# Patient Record
Sex: FEMALE | Race: OTHER | HISPANIC OR LATINO | URBAN - METROPOLITAN AREA
[De-identification: names, ages, dates, MRNs, and addresses within clinical notes are randomized per-mention and may not be internally consistent; named-entity substitution may affect disease eponyms.]

---

## 2023-07-09 ENCOUNTER — EMERGENCY (EMERGENCY)
Facility: HOSPITAL | Age: 23
LOS: 1 days | Discharge: HOME | End: 2023-07-09
Attending: EMERGENCY MEDICINE | Admitting: EMERGENCY MEDICINE
Payer: COMMERCIAL

## 2023-07-09 VITALS
HEIGHT: 62 IN | OXYGEN SATURATION: 99 % | DIASTOLIC BLOOD PRESSURE: 62 MMHG | HEART RATE: 87 BPM | WEIGHT: 218.04 LBS | RESPIRATION RATE: 17 BRPM | TEMPERATURE: 98 F | SYSTOLIC BLOOD PRESSURE: 95 MMHG

## 2023-07-09 PROCEDURE — 99283 EMERGENCY DEPT VISIT LOW MDM: CPT | Mod: 25

## 2023-07-09 PROCEDURE — 10081 I&D PILONIDAL CYST COMP: CPT

## 2023-07-09 PROCEDURE — 10080 I&D PILONIDAL CYST SIMPLE: CPT

## 2023-07-09 RX ORDER — IBUPROFEN 200 MG
600 TABLET ORAL ONCE
Refills: 0 | Status: COMPLETED | OUTPATIENT
Start: 2023-07-09 | End: 2023-07-09

## 2023-07-09 RX ORDER — LIDOCAINE HYDROCHLORIDE AND EPINEPHRINE 10; 10 MG/ML; UG/ML
2 INJECTION, SOLUTION INFILTRATION; PERINEURAL ONCE
Refills: 0 | Status: COMPLETED | OUTPATIENT
Start: 2023-07-09 | End: 2023-07-09

## 2023-07-09 RX ORDER — IBUPROFEN 200 MG
1 TABLET ORAL
Qty: 20 | Refills: 0
Start: 2023-07-09

## 2023-07-09 RX ADMIN — Medication 600 MILLIGRAM(S): at 14:49

## 2023-07-09 RX ADMIN — LIDOCAINE HYDROCHLORIDE AND EPINEPHRINE 2 MILLILITER(S): 10; 10 INJECTION, SOLUTION INFILTRATION; PERINEURAL at 14:49

## 2023-07-09 RX ADMIN — Medication 1 TABLET(S): at 14:49

## 2023-07-09 NOTE — ED PROVIDER NOTE - HIV OFFER
1. \"Have you been to the ER, urgent care clinic since your last visit? Hospitalized since your last visit? \" No    2. \"Have you seen or consulted any other health care providers outside of the [address] [place] [address] since your last visit? \" No     3. For patients aged 39-70: Has the patient had a colonoscopy / FIT/ Cologuard? NA - based on age      If the patient is female:    4. For patients aged 41-77: Has the patient had a mammogram within the past 2 years? NA - based on age or sex      11. For patients aged 21-65: Has the patient had a pap smear?  No     Chief Complaint   Patient presents with    Ear Pain    Headache       Visit Vitals  /84 (BP 1 Location: Right upper arm, BP Patient Position: Sitting, BP Cuff Size: Adult)   Pulse 65   Temp 98.1 °F (36.7 °C) (Oral)   Resp 17   Ht 5' 5\" (1.651 m)   Wt 193 lb (87.5 kg)   LMP 04/02/2022   SpO2 99%   BMI 32.12 kg/m² Opt out

## 2023-07-09 NOTE — ED ADULT NURSE NOTE - CHIEF COMPLAINT QUOTE
Pt sent by  for eval for pilonidal abscess x 3-4 days. Pt denies fevers, drainage, radiating pain. No pmh.

## 2023-07-09 NOTE — ED ADULT TRIAGE NOTE - BIRTH SEX
Interval History: No acute events    Review of Systems   Respiratory:  Negative for shortness of breath.    Gastrointestinal:  Negative for constipation, diarrhea, nausea and vomiting.   Objective:     Vital Signs (Most Recent):  Temp: 96.3 °F (35.7 °C) (04/22/22 1122)  Pulse: 93 (04/22/22 1122)  Resp: 19 (04/22/22 1122)  BP: (!) 121/58 (04/22/22 1122)  SpO2: 98 % (04/22/22 1122)   Vital Signs (24h Range):  Temp:  [96.2 °F (35.7 °C)-99.4 °F (37.4 °C)] 96.3 °F (35.7 °C)  Pulse:  [] 93  Resp:  [18-20] 19  SpO2:  [95 %-100 %] 98 %  BP: (121-192)/(58-81) 121/58     Weight: 107.8 kg (237 lb 10.5 oz)  Body mass index is 39.55 kg/m².    Estimated Creatinine Clearance: 103.8 mL/min (based on SCr of 0.8 mg/dL).    Physical Exam  Cardiovascular:      Heart sounds: Normal heart sounds.   Pulmonary:      Breath sounds: Normal breath sounds.   Abdominal:      General: Bowel sounds are normal. There is no distension.      Palpations: Abdomen is soft.      Tenderness: There is abdominal tenderness.      Comments: Still some tenderness at sites of abdominal injections and indurated areas; ecchymoses of lower abdomen persist    Musculoskeletal:      Right lower leg: No edema.      Left lower leg: No edema.      Comments: Both right and left leg erythema and edema has decreased       Significant Labs: All pertinent labs within the past 24 hours have been reviewed.    Significant Imaging: I have reviewed all pertinent imaging results/findings within the past 24 hours.   Female

## 2023-07-09 NOTE — ED PROVIDER NOTE - OBJECTIVE STATEMENT
here with swelling/pain in upper buttock x 3-4 days. No drainage, fever, chills, trauma. Never had before. Didn't try any treatments. Went to urgent care and referred to ED. No treatment given.

## 2023-07-09 NOTE — ED PROVIDER NOTE - NSFOLLOWUPINSTRUCTIONS_ED_ALL_ED_FT
Please see referral to surgery for followup and possible removal of your cyst.  Call for appointment.  If you have any problems with followup, please call the ED Referral Coordinator at [phone].  Return to the ER if symptoms worsen or other concerns.    Pilonidal Cyst Drainage, Care After  The following information offers guidance on how to care for yourself after your procedure. Your health care provider may also give you more specific instructions. If you have problems or questions, contact your health care provider.    What can I expect after the procedure?  After the procedure, it is common to have:  Pain that gets better when you take medicine.  Some fluid or blood coming from your wound.  Follow these instructions at home:  Medicines    Take over-the-counter and prescription medicines only as told by your health care provider.  If you were prescribed antibiotics, take them as told by your health care provider. Do not stop using the antibiotic even if you start to feel better.  Ask your health care provider if the medicine prescribed to you:  Requires you to avoid driving or using machinery.  Can cause constipation. You may need to take these actions to prevent or treat constipation:  Drink enough fluid to keep your urine pale yellow.  Take over-the-counter or prescription medicines.  Eat foods that are high in fiber, such as beans, whole grains, and fresh fruits and vegetables.  Limit foods that are high in fat and processed sugars, such as fried or sweet foods.  Bathing    Do not take baths or showers, swim, or use a hot tub until your health care provider approves. You may only be allowed to take sponge baths.  When you can return to bathing will depend on the type of wound that you have.  If your wound was packed with a germ-free packing material, keep the area dry until your packing has been removed. After the packing has been removed, you may start taking showers when your health care provider approves.  If the edges of the incision around your wound were stitched to your skin (marsupialization), you may start taking showers the day after surgery, or when your health care provider approves. Let the water from the shower moisten your bandage (dressing). This will make it easier to take off. Remove your dressing before you shower.  While bathing, clean your buttocks area gently with soap and water.  After bathing:  Pat the area dry with a soft, clean towel.  If directed, cover the area with a clean dressing.  Wound care    Two stitched wounds. One is normal. The other is red with pus and infected.  You may need to have a caregiver help you with wound care and dressing changes.  Follow instructions from your health care provider about how to take care of your wound. Make sure you:  Wash your hands with soap and water for at least 20 seconds before and after you change your dressing. If soap and water are not available, use hand .  Change your dressing as told by your health care provider.  Leave stitches (sutures), skin glue, or adhesive strips in place. These skin closures may need to stay in place for 2 weeks or longer. If adhesive strip edges start to loosen and curl up, you may trim the loose edges. Do not remove adhesive strips completely unless your health care provider tells you to do that.  Check your wound every day for signs of infection. Check for:  Redness, swelling, or more pain.  More fluid or blood.  Warmth.  Pus or a bad smell.  Follow any additional instructions from your health care provider on how to care for your wound, such as wound cleaning, wound flushing (irrigation), or packing your wound with a dressing.  If you had marsupialization, ask your health care provider when you can stop using a dressing.  Lifestyle    Do not do activities that irritate or put pressure on your buttocks for about 2 weeks, or as long as told by your health care provider. These activities include bike riding, running, and anything that involves a twisting motion.  Rest as told by your health care provider.  Do not sit for a long time without moving. Get up to take short walks every 1–2 hours. This will improve blood flow and breathing. Ask for help if you feel weak or unsteady.  Sleep on your side instead of your back.  Avoid wearing tight underwear and tight pants.  Return to your normal activities as told by your health care provider. Ask your health care provider what activities are safe for you.  General instructions    Do not use any products that contain nicotine or tobacco. These products include cigarettes, chewing tobacco, and vaping devices, such as e-cigarettes. These can delay wound healing after surgery. If you need help quitting, ask your health care provider.  Keep all follow-up visits. This is important to monitor healing. If you had an incision and drainage procedure with wound packing, your packing may be changed or removed at follow-up visits.  Contact a health care provider if:  You have pain that does not get better with medicine.  You have any of these signs of infection:  More redness, swelling, or pain around your incision.  More fluid or blood coming from your incision.  Warmth coming from your incision.  Pus or a bad smell coming from your incision.  A fever.  You have muscle aches.  You feel generally sick.  You are dizzy.  Summary  A pilonidal cyst is a fluid-filled sac that forms under the skin near the tailbone. It is common to have some fluid or blood coming from your wound after a procedure to drain a pilonidal cyst.  If you were prescribed antibiotics, take them as told by your health care provider. Do not stop taking the antibiotic even if you start to feel better.  You may need to have a caregiver help you with wound care and dressing changes.  Return to your health care provider as instructed to have any packing material changed or removed.  This information is not intended to replace advice given to you by your health care provider. Make sure you discuss any questions you have with your health care provider. Return in 48 hours for packing removal/ wound check and please see referral to surgery for followup and possible removal of your cyst.  Call for appointment.  If you have any problems with followup, please call the ED Referral Coordinator at [phone].  Return to the ER if symptoms worsen or other concerns.     Pilonidal Cyst Drainage, Care After  The following information offers guidance on how to care for yourself after your procedure. Your health care provider may also give you more specific instructions. If you have problems or questions, contact your health care provider.    What can I expect after the procedure?  After the procedure, it is common to have:  Pain that gets better when you take medicine.  Some fluid or blood coming from your wound.  Follow these instructions at home:  Medicines    Take over-the-counter and prescription medicines only as told by your health care provider.  If you were prescribed antibiotics, take them as told by your health care provider. Do not stop using the antibiotic even if you start to feel better.  Ask your health care provider if the medicine prescribed to you:  Requires you to avoid driving or using machinery.  Can cause constipation. You may need to take these actions to prevent or treat constipation:  Drink enough fluid to keep your urine pale yellow.  Take over-the-counter or prescription medicines.  Eat foods that are high in fiber, such as beans, whole grains, and fresh fruits and vegetables.  Limit foods that are high in fat and processed sugars, such as fried or sweet foods.  Bathing    Do not take baths or showers, swim, or use a hot tub until your health care provider approves. You may only be allowed to take sponge baths.  When you can return to bathing will depend on the type of wound that you have.  If your wound was packed with a germ-free packing material, keep the area dry until your packing has been removed. After the packing has been removed, you may start taking showers when your health care provider approves.  If the edges of the incision around your wound were stitched to your skin (marsupialization), you may start taking showers the day after surgery, or when your health care provider approves. Let the water from the shower moisten your bandage (dressing). This will make it easier to take off. Remove your dressing before you shower.  While bathing, clean your buttocks area gently with soap and water.  After bathing:  Pat the area dry with a soft, clean towel.  If directed, cover the area with a clean dressing.  Wound care    Two stitched wounds. One is normal. The other is red with pus and infected.  You may need to have a caregiver help you with wound care and dressing changes.  Follow instructions from your health care provider about how to take care of your wound. Make sure you:  Wash your hands with soap and water for at least 20 seconds before and after you change your dressing. If soap and water are not available, use hand .  Change your dressing as told by your health care provider.  Leave stitches (sutures), skin glue, or adhesive strips in place. These skin closures may need to stay in place for 2 weeks or longer. If adhesive strip edges start to loosen and curl up, you may trim the loose edges. Do not remove adhesive strips completely unless your health care provider tells you to do that.  Check your wound every day for signs of infection. Check for:  Redness, swelling, or more pain.  More fluid or blood.  Warmth.  Pus or a bad smell.  Follow any additional instructions from your health care provider on how to care for your wound, such as wound cleaning, wound flushing (irrigation), or packing your wound with a dressing.  If you had marsupialization, ask your health care provider when you can stop using a dressing.  Lifestyle    Do not do activities that irritate or put pressure on your buttocks for about 2 weeks, or as long as told by your health care provider. These activities include bike riding, running, and anything that involves a twisting motion.  Rest as told by your health care provider.  Do not sit for a long time without moving. Get up to take short walks every 1–2 hours. This will improve blood flow and breathing. Ask for help if you feel weak or unsteady.  Sleep on your side instead of your back.  Avoid wearing tight underwear and tight pants.  Return to your normal activities as told by your health care provider. Ask your health care provider what activities are safe for you.  General instructions    Do not use any products that contain nicotine or tobacco. These products include cigarettes, chewing tobacco, and vaping devices, such as e-cigarettes. These can delay wound healing after surgery. If you need help quitting, ask your health care provider.  Keep all follow-up visits. This is important to monitor healing. If you had an incision and drainage procedure with wound packing, your packing may be changed or removed at follow-up visits.  Contact a health care provider if:  You have pain that does not get better with medicine.  You have any of these signs of infection:  More redness, swelling, or pain around your incision.  More fluid or blood coming from your incision.  Warmth coming from your incision.  Pus or a bad smell coming from your incision.  A fever.  You have muscle aches.  You feel generally sick.  You are dizzy.  Summary  A pilonidal cyst is a fluid-filled sac that forms under the skin near the tailbone. It is common to have some fluid or blood coming from your wound after a procedure to drain a pilonidal cyst.  If you were prescribed antibiotics, take them as told by your health care provider. Do not stop taking the antibiotic even if you start to feel better.  You may need to have a caregiver help you with wound care and dressing changes.  Return to your health care provider as instructed to have any packing material changed or removed.  This information is not intended to replace advice given to you by your health care provider. Make sure you discuss any questions you have with your health care provider.

## 2023-07-09 NOTE — ED ADULT NURSE NOTE - OBJECTIVE STATEMENT
Patient came to ER stating "I have a cyst to back side and it found it 3 days ago and it is very painful and bigger now".

## 2023-07-09 NOTE — ED PROVIDER NOTE - PATIENT PORTAL LINK FT
You can access the FollowMyHealth Patient Portal offered by [facility] by registering at the following website: [email or web address]. By joining [employer]’s FollowMyHealth portal, you will also be able to view your health information using other applications (apps) compatible with our system.

## 2023-07-09 NOTE — ED ADULT TRIAGE NOTE - ACCOMPANIED BY
10/01/2022  Fitz Christianson    Current provider:  Antonio Hadley Jr.,*    Device interrogation:  TXP LVAD INTERROGATIONS 10/1/2022 10/1/2022 10/1/2022 10/1/2022 10/1/2022 9/30/2022 9/30/2022   Type HeartMate3 HeartMate3 HeartMate3 HeartMate3 HeartMate3 HeartMate3 HeartMate3   Flow 5.2 5.5 5.4 5.5 5.3 5.4 5.4   Speed 6400 6400 6400 6400 6400 6400 6400   PI 2.4 2.3 1.6 1.5 2.1 1.5 2.1   Power ([place]) 5.4 5.5 5.5 5.5 5.5 5.5 5.5   LSL 6000 6000 6000 6000 6000 6000 6000   Low Flow Alarm - - - - - - -   High Power Alarm - - - - - - -   Pulsatility Intermittent pulse Intermittent pulse Intermittent pulse Intermittent pulse Pulse Pulse Intermittent pulse          Rounded on Tim Richards to ensure all mechanical assist device settings (IABP or VAD) were appropriate and all parameters were within limits.  I was able to ensure all back up equipment was present, the staff had no issues, and the Perfusion Department daily rounding was complete.      For implantable VADs: Interrogation of Ventricular assist device was performed with analysis of device parameters and review of device function. I have personally reviewed the interrogation findings and agree with findings as stated.     In emergency, the nursing units have been notified to contact the perfusion department either by:  Calling [id number] from 630am to 4pm Mon thru Fri, utilizing the On-Call Finder functionality of Epic and searching for Perfusion, or by contacting the hospital  from 4pm to 630am and on weekends and asking to speak with the perfusionist on call.    9:13 PM      Self

## 2023-07-09 NOTE — ED ADULT NURSE NOTE - NSFALLUNIVINTERV_ED_ALL_ED
Bed/Stretcher in lowest position, wheels locked, appropriate side rails in place/Call bell, personal items and telephone in reach/Instruct patient to call for assistance before getting out of bed/chair/stretcher/Non-slip footwear applied when patient is off stretcher/[place] to call system/Physically safe environment - no spills, clutter or unnecessary equipment/Purposeful proactive rounding/Room/bathroom lighting operational, light cord in reach

## 2023-07-09 NOTE — ED PROVIDER NOTE - CARE PROVIDER_API CALL
Estella Rivera  Surgery  [address] [place] [address], [address]  [place] [address]  Phone: [phone]  Fax: [phone]  Follow Up Time:

## 2023-07-09 NOTE — ED PROVIDER NOTE - CLINICAL SUMMARY MEDICAL DECISION MAKING FREE TEXT BOX
pilonidal abscess. no fever/systemic symptoms. well appearing. I and d done. tx w bactrim as some surrounding cellulitis. f/u surgery for cyst removal. return precautions discussed pilonidal abscess. no fever/systemic symptoms. well appearing. I and d done. tx w bactrim as some surrounding cellulitis. return 2 days for check/packing removal. f/u surgery for cyst removal. return precautions discussed

## 2023-07-11 ENCOUNTER — EMERGENCY (EMERGENCY)
Facility: HOSPITAL | Age: 23
LOS: 1 days | Discharge: LEFT BEFORE DISCHARGE | End: 2023-07-11
Admitting: EMERGENCY MEDICINE
Payer: MEDICAID

## 2023-07-11 VITALS
SYSTOLIC BLOOD PRESSURE: 110 MMHG | HEIGHT: 62 IN | WEIGHT: 218.04 LBS | OXYGEN SATURATION: 100 % | HEART RATE: 61 BPM | DIASTOLIC BLOOD PRESSURE: 59 MMHG | RESPIRATION RATE: 18 BRPM | TEMPERATURE: 99 F

## 2023-07-11 DIAGNOSIS — L02.31 CUTANEOUS ABSCESS OF BUTTOCK: ICD-10-CM

## 2023-07-11 DIAGNOSIS — M79.89 OTHER SPECIFIED SOFT TISSUE DISORDERS: ICD-10-CM

## 2023-07-11 DIAGNOSIS — L05.01 PILONIDAL CYST WITH ABSCESS: ICD-10-CM

## 2023-07-11 DIAGNOSIS — Z53.21 PROCEDURE AND TREATMENT NOT CARRIED OUT DUE TO PATIENT LEAVING PRIOR TO BEING SEEN BY HEALTH CARE PROVIDER: ICD-10-CM

## 2023-07-11 PROCEDURE — L9991: CPT

## 2023-07-11 NOTE — ED ADULT NURSE REASSESSMENT NOTE - NSFALLUNIVINTERV_ED_ALL_ED
Bed/Stretcher in lowest position, wheels locked, appropriate side rails in place/Call bell, personal items and telephone in reach/Instruct patient to call for assistance before getting out of bed/chair/stretcher/Non-slip footwear applied when patient is off stretcher/[place] to call system/Physically safe environment - no spills, clutter or unnecessary equipment/Purposeful proactive rounding/Room/bathroom lighting operational, light cord in reach

## 2023-07-11 NOTE — ED ADULT NURSE NOTE - OBJECTIVE STATEMENT
Patient returns for wound check s/p abscess I & D buttocks on July 9, dressing in place, no fever/chills, dressing intact.  Taking BActrim PO.

## 2023-07-11 NOTE — ED ADULT NURSE REASSESSMENT NOTE - NS ED NURSE REASSESS COMMENT FT1
Patient states needs to leave, does not want to wait for MD, states will return tomorrow.  No pain or other symptom complaint, announced leaving and seen by staff leaving in stable condition.

## 2023-07-11 NOTE — ED ADULT NURSE NOTE - NSFALLUNIVINTERV_ED_ALL_ED
Bed/Stretcher in lowest position, wheels locked, appropriate side rails in place/Call bell, personal items and telephone in reach/Instruct patient to call for assistance before getting out of bed/chair/stretcher/Non-slip footwear applied when patient is off stretcher/[place] to call system/Physically safe environment - no spills, clutter or unnecessary equipment/Purposeful proactive rounding/Room/bathroom lighting operational, light cord in reach

## 2023-07-12 PROBLEM — Z78.9 OTHER SPECIFIED HEALTH STATUS: Chronic | Status: ACTIVE | Noted: 2023-07-09

## 2023-08-04 NOTE — ED PROVIDER NOTE - TOBACCO USE
Nutrition Assessment   Reason for Consult/Assessment: Other (comments)Tube Feeding Consult     Diagnosis and Hx: Reviewed    Pertinent Nutrition History: Patient with hypertension, hyperlipidemia, CAD s/p CABG, right thalamic ICH and CKD IV.    Pertinent Nutrition Information: (23) Patient admitted with new right thalamic ICH. Patient on room air. Noted patient too lethargic for swallow evaluation this am. Noted plan for SLP evaluation when medically feasible. Tube feeding consult acknowledged. Patient pulled out NGT. Spoke with RN. NGT replaced and confirmed. Discussed tube feeding recommendation. Labs and medications reviewed. Per documentation, creatinine at baseline. No documented edema or pressure wounds.                                 Diet Order: NPO                  Diet tolerance: NPO   Food Allergies: None known    Demographic/Anthropometrics Information  Gender: male  Patient Age: 59 year old  Height:   Ht Readings from Last 1 Encounters:   23 6' (1.829 m)      Weight:   Wt Readings from Last 1 Encounters:   23 92.6 kg      BMI:   BMI Readings from Last 1 Encounters:   23 27.69 kg/m²       Usual Weight:  (N/A)  % Weight Change: N/A           Estimated Needs:     Calculated energy needs: 8535-9762 kcals           Calculated protein needs:   g  Calculated Fluid Needs: Per Provider              NFPE  Nutrition Focused Physical Exam  Physical Exam Completed: Yes  Body Fat  Overall Body Fat: Normal  Muscle Mass  Overall Muscle Mass: Normal                TREATMENT PLAN: Monitoring & Interventions  1. Tube Feeding: Recommend Jevity 1.2 - 75ml/hr (goal as piedad).  2. Oral Diet: If oral diet, follow SLP recommendation for proper consistency.     Intervention: Enteral nutrition            Enteral Nutrition Formula: Jevity 1.2 Calorie   Rate: 75ml/hr  Access Site:   Calories Provided by Tube Feedin kcals (% of kcal needs)  Protein Provided by Tube Feedingm protein  (% of protein needs)  Free Water Provided by Tube Feedinml free water                                                Goal: Tolerate enteral feeding goal   Intervention goal status: Initiated  Time frame to achieve goal: Ongoing     Dietitian will monitor: Biochemical data, medical tests, procedures, Enteral nutrition intake            Nutrition Diagnosis / PES  Nutrition Diagnosis: Inadequate oral intake  Related to: Confusion/mental status changes   As evidenced by: Need for NPO status      Primary Nutrition Diagnosis status: New nutrition diagnosis                    Never smoker

## 2025-07-06 ENCOUNTER — EMERGENCY (EMERGENCY)
Facility: HOSPITAL | Age: 25
LOS: 1 days | End: 2025-07-06
Attending: EMERGENCY MEDICINE | Admitting: EMERGENCY MEDICINE
Payer: COMMERCIAL

## 2025-07-06 VITALS
RESPIRATION RATE: 18 BRPM | DIASTOLIC BLOOD PRESSURE: 95 MMHG | OXYGEN SATURATION: 98 % | TEMPERATURE: 99 F | HEART RATE: 90 BPM | SYSTOLIC BLOOD PRESSURE: 132 MMHG

## 2025-07-06 VITALS
HEART RATE: 91 BPM | WEIGHT: 250 LBS | RESPIRATION RATE: 18 BRPM | HEIGHT: 62 IN | OXYGEN SATURATION: 99 % | TEMPERATURE: 98 F | DIASTOLIC BLOOD PRESSURE: 78 MMHG | SYSTOLIC BLOOD PRESSURE: 113 MMHG

## 2025-07-06 PROCEDURE — 99284 EMERGENCY DEPT VISIT MOD MDM: CPT | Mod: 25

## 2025-07-06 PROCEDURE — 10080 I&D PILONIDAL CYST SIMPLE: CPT

## 2025-07-06 RX ORDER — OXYCODONE HYDROCHLORIDE AND ACETAMINOPHEN 10; 325 MG/1; MG/1
1 TABLET ORAL ONCE
Refills: 0 | Status: DISCONTINUED | OUTPATIENT
Start: 2025-07-06 | End: 2025-07-06

## 2025-07-06 RX ADMIN — OXYCODONE HYDROCHLORIDE AND ACETAMINOPHEN 1 TABLET(S): 10; 325 TABLET ORAL at 11:47

## 2025-07-06 NOTE — ED PROVIDER NOTE - PATIENT PORTAL LINK FT
You can access the FollowMyHealth Patient Portal offered by [facility] by registering at the following website: [email or web address]. By joining [employer]’s FollowMyHealth portal, you will also be able to view your health information using other applications (apps) compatible with our system.

## 2025-07-06 NOTE — ED PROVIDER NOTE - NSFOLLOWUPINSTRUCTIONS_ED_ALL_ED_FT
Return to ED in 2 days for a wound check. Keep area clean and dry. Soak in a warm tub to help drain the area     Abscess    An abscess is an infected area that contains a collection of pus and debris. It can occur in almost any part of the body and occurs when the tissue gets infection. Symptoms include a painful mass that is red, warm, tender that might break open and HAVE drainage. If your health care provider gave you antibiotics make sure to take the full course and do not stop even if feeling better.     SEEK IMMEDIATE MEDICAL CARE IF YOU HAVE ANY OF THE FOLLOWING SYMPTOMS: chills, fever, muscle aches, or red streaking from the area.

## 2025-07-06 NOTE — ED PROVIDER NOTE - PHYSICAL EXAMINATION
CONSTITUTIONAL: Well-appearing;  in no apparent distress.   HEAD: Normocephalic; atraumatic.   EYES: PERRL; EOM intact; conjunctiva and sclera clear  SKIN: tender induration to gluteal cleft, not extending to rectum, no surrounding erythema, bedside US with large pocket
No

## 2025-07-06 NOTE — ED ADULT TRIAGE NOTE - CHIEF COMPLAINT QUOTE
What Type Of Note Output Would You Prefer (Optional)?: Standard Output How Severe Are Your Spot(S)?: mild Have Your Spot(S) Been Treated In The Past?: has not been treated Hpi Title: Evaluation of Skin Lesions c/o sacral cyst/abscess x  1 wk. denies any drainage, f/c.

## 2025-07-06 NOTE — ED PROVIDER NOTE - OBJECTIVE STATEMENT
25-year-old female with past medical history of a pilonidal abscess complaining of painful swelling to her buttocks x 1 week.  Patient states that similar to the abscess she had before.  No fevers or chills.

## 2025-07-06 NOTE — ED ADULT NURSE NOTE - NSFALLUNIVINTERV_ED_ALL_ED
Bed/Stretcher in lowest position, wheels locked, appropriate side rails in place/Call bell, personal items and telephone in reach/Instruct patient to call for assistance before getting out of bed/chair/stretcher/Non-slip footwear applied when patient is off stretcher/[place] to call system/Physically safe environment - no spills, clutter or unnecessary equipment/Purposeful proactive rounding/Room/bathroom lighting operational, light cord in reach

## 2025-07-06 NOTE — ED PROVIDER NOTE - CLINICAL SUMMARY MEDICAL DECISION MAKING FREE TEXT BOX
25-year-old female with past medical history of a pilonidal abscess complaining of painful swelling to her buttocks x 1 week.  Patient states that similar to the abscess she had before.  No fevers or chills. tender induration to gluteal cleft, not extending to rectum, no surrounding erythema, bedside US with large pocket. abscess drained with large amount of pus

## 2025-07-06 NOTE — ED PROVIDER NOTE - ATTENDING APP SHARED VISIT CONTRIBUTION OF CARE
24 yo F h/o pilonidal abscess c/o pain, swelling, redness in same location as prior abscess x 1 wk, unchanged w warm compresses at home.  No fever.    CONSTITUTIONAL:  Generally well appearing, no acute distress, alert, awake and oriented  HEENT:  Moist mucous membranes, normal voice, airway patent  PULM:  No accessory muscle use, speaking in full sentences  SKIN:  top of buttock crease w swelling, ttp, erythema, fluctance  Pt w pilonidal abscess on exam.  Plan I and D; fu w pcp, gen surg 2/2 recurrence.

## 2025-07-08 ENCOUNTER — EMERGENCY (EMERGENCY)
Facility: HOSPITAL | Age: 25
LOS: 1 days | End: 2025-07-08
Attending: STUDENT IN AN ORGANIZED HEALTH CARE EDUCATION/TRAINING PROGRAM | Admitting: STUDENT IN AN ORGANIZED HEALTH CARE EDUCATION/TRAINING PROGRAM
Payer: COMMERCIAL

## 2025-07-08 VITALS
RESPIRATION RATE: 18 BRPM | HEIGHT: 62 IN | DIASTOLIC BLOOD PRESSURE: 74 MMHG | SYSTOLIC BLOOD PRESSURE: 104 MMHG | HEART RATE: 88 BPM | OXYGEN SATURATION: 95 % | TEMPERATURE: 99 F | WEIGHT: 250 LBS

## 2025-07-08 DIAGNOSIS — L05.01 PILONIDAL CYST WITH ABSCESS: ICD-10-CM

## 2025-07-08 PROCEDURE — L9995: CPT

## 2025-07-08 PROCEDURE — 99212 OFFICE O/P EST SF 10 MIN: CPT

## 2025-07-08 NOTE — ED PROVIDER NOTE - OBJECTIVE STATEMENT
25-year-old female with past medical history of a pilonidal abscess, I&D 2 days ago here. Presenting for wound check. Improved pain, no abnormal discharge. Denies fevers, chills.

## 2025-07-08 NOTE — ED ADULT NURSE NOTE - OBJECTIVE STATEMENT
25y F denies pmHx presents to ED co wound check. Reports having pilonidal cyst drained 2 days ago. Endorses itching, draining/bleeding from site. Denies f/c, pain at this time. Pt requesting wound check. Not currently on abx. Pt AAOx4, speaking in full and clear sentences, NAD at this time. Ambulatory with steady gait on arrival.

## 2025-07-08 NOTE — ED PROVIDER NOTE - CLINICAL SUMMARY MEDICAL DECISION MAKING FREE TEXT BOX
25-year-old female with past medical history of a pilonidal abscess, I&D 2 days ago here. Presenting for wound check. Improved pain, no abnormal discharge. Denies fevers, chills.     Well healing, no signs of recurrence.

## 2025-07-08 NOTE — ED PROVIDER NOTE - PATIENT PORTAL LINK FT
You can access the FollowMyHealth Patient Portal offered by [facility] by registering at the following website: [email or web address]. By joining [facility]’s FollowMyHealth portal, you will also be able to view your health information using other applications (apps) compatible with our system.

## 2025-07-08 NOTE — ED ADULT TRIAGE NOTE - CHIEF COMPLAINT QUOTE
here for wound check after I&D of pilonidal abscess 2 days ago. reports improvement in pain, denies f/c.

## 2025-07-10 DIAGNOSIS — L05.01 PILONIDAL CYST WITH ABSCESS: ICD-10-CM
